# Patient Record
Sex: MALE | Race: BLACK OR AFRICAN AMERICAN | NOT HISPANIC OR LATINO | URBAN - METROPOLITAN AREA
[De-identification: names, ages, dates, MRNs, and addresses within clinical notes are randomized per-mention and may not be internally consistent; named-entity substitution may affect disease eponyms.]

---

## 2023-07-23 ENCOUNTER — EMERGENCY (EMERGENCY)
Facility: HOSPITAL | Age: 33
LOS: 1 days | Discharge: HOME | End: 2023-07-23
Admitting: EMERGENCY MEDICINE
Payer: SELF-PAY

## 2023-07-23 VITALS
SYSTOLIC BLOOD PRESSURE: 129 MMHG | HEART RATE: 84 BPM | RESPIRATION RATE: 18 BRPM | OXYGEN SATURATION: 96 % | DIASTOLIC BLOOD PRESSURE: 89 MMHG | TEMPERATURE: 98 F

## 2023-07-23 VITALS
OXYGEN SATURATION: 94 % | HEART RATE: 115 BPM | DIASTOLIC BLOOD PRESSURE: 110 MMHG | HEIGHT: 68 IN | RESPIRATION RATE: 16 BRPM | TEMPERATURE: 98 F | SYSTOLIC BLOOD PRESSURE: 165 MMHG | WEIGHT: 175.05 LBS

## 2023-07-23 PROCEDURE — 99284 EMERGENCY DEPT VISIT MOD MDM: CPT

## 2023-07-23 RX ORDER — ONDANSETRON 8 MG/1
4 TABLET, FILM COATED ORAL ONCE
Refills: 0 | Status: COMPLETED | OUTPATIENT
Start: 2023-07-23 | End: 2023-07-23

## 2023-07-23 RX ADMIN — ONDANSETRON 4 MILLIGRAM(S): 8 TABLET, FILM COATED ORAL at 03:26

## 2023-07-23 RX ADMIN — ONDANSETRON 4 MILLIGRAM(S): 8 TABLET, FILM COATED ORAL at 06:38

## 2023-07-23 NOTE — ED PROVIDER NOTE - CLINICAL SUMMARY MEDICAL DECISION MAKING FREE TEXT BOX
Patient BIB EMS for AMS found [place] park given narcan 1mg IM with improvement of MS complaining of nausea and vomiting. admits to ETOH and cocaine intranasal, marakiersten this evening. VS stable, on exam patient is drowsy, but answering questions appropriately. neurovascularly intact, non focal on exam, no sign of trauma. O2 sat % on RA while awake but starts to snore and desat's to low 90's on sleeping. placed on 2L NC with improvement of Sat's. will observe in ED until clinical sobriety

## 2023-07-23 NOTE — ED PROVIDER NOTE - PATIENT PORTAL LINK FT
You can access the FollowMyHealth Patient Portal offered by [facility] by registering at the following website: [email or web address]. By joining [employer]’s FollowMyHealth portal, you will also be able to view your health information using other applications (apps) compatible with our system.

## 2023-07-23 NOTE — ED ADULT NURSE NOTE - CHIEF COMPLAINT QUOTE
PT brought in by EMS after the pt was found unresponsive on the sidewalk on [address]. Pt given 1mg IV push narcan. Pt arrived into the ED awake and alert stating he thought he was taking cocaine. Pt also admits to alcohol use. Vomitted at triage.

## 2023-07-23 NOTE — ED PROVIDER NOTE - OBJECTIVE STATEMENT
04/30/20      Hebron Shorten  [address]  [address], [address] [facility],     While reviewing your medical chart your primary care physician has determined you are either due or past due for an office follow up.  Due to the recent w Patient BIB EMS for AMS. patient was [place] park. Given narcan 1mg IM with improvement of MS. complaining of nausea with one episode of vomiting at triage. Admits to drinking alcohol and snorting cocaine this eveing

## 2023-07-26 DIAGNOSIS — R41.82 ALTERED MENTAL STATUS, UNSPECIFIED: ICD-10-CM

## 2023-07-26 DIAGNOSIS — T40.5X1A POISONING BY COCAINE, ACCIDENTAL (UNINTENTIONAL), INITIAL ENCOUNTER: ICD-10-CM

## 2023-07-26 DIAGNOSIS — F10.90 ALCOHOL USE, UNSPECIFIED, UNCOMPLICATED: ICD-10-CM

## 2023-07-26 DIAGNOSIS — R11.2 NAUSEA WITH VOMITING, UNSPECIFIED: ICD-10-CM
